# Patient Record
Sex: MALE | Race: BLACK OR AFRICAN AMERICAN | Employment: OTHER | URBAN - METROPOLITAN AREA
[De-identification: names, ages, dates, MRNs, and addresses within clinical notes are randomized per-mention and may not be internally consistent; named-entity substitution may affect disease eponyms.]

---

## 2024-02-09 ENCOUNTER — OFFICE VISIT (OUTPATIENT)
Age: 74
End: 2024-02-09
Payer: MEDICARE

## 2024-02-09 VITALS
RESPIRATION RATE: 17 BRPM | WEIGHT: 210 LBS | HEIGHT: 72 IN | SYSTOLIC BLOOD PRESSURE: 150 MMHG | HEART RATE: 75 BPM | OXYGEN SATURATION: 99 % | DIASTOLIC BLOOD PRESSURE: 82 MMHG | BODY MASS INDEX: 28.44 KG/M2 | TEMPERATURE: 97.2 F

## 2024-02-09 DIAGNOSIS — I11.0 HYPERTENSIVE HEART DISEASE WITH CHRONIC DIASTOLIC CONGESTIVE HEART FAILURE (HCC): ICD-10-CM

## 2024-02-09 DIAGNOSIS — I50.32 CHRONIC DIASTOLIC CHF (CONGESTIVE HEART FAILURE) (HCC): ICD-10-CM

## 2024-02-09 DIAGNOSIS — I35.0 NONRHEUMATIC AORTIC VALVE STENOSIS: Primary | ICD-10-CM

## 2024-02-09 DIAGNOSIS — I50.32 HYPERTENSIVE HEART DISEASE WITH CHRONIC DIASTOLIC CONGESTIVE HEART FAILURE (HCC): ICD-10-CM

## 2024-02-09 DIAGNOSIS — Z95.810 ICD (IMPLANTABLE CARDIOVERTER-DEFIBRILLATOR) IN PLACE: ICD-10-CM

## 2024-02-09 PROCEDURE — 99214 OFFICE O/P EST MOD 30 MIN: CPT

## 2024-02-09 PROCEDURE — 1123F ACP DISCUSS/DSCN MKR DOCD: CPT

## 2024-02-09 RX ORDER — ROSUVASTATIN CALCIUM 20 MG/1
20 TABLET, COATED ORAL
COMMUNITY
Start: 2024-01-11 | End: 2024-02-09

## 2024-02-09 RX ORDER — CLOPIDOGREL BISULFATE 75 MG/1
1 TABLET ORAL
COMMUNITY

## 2024-02-09 RX ORDER — ALBUTEROL SULFATE 90 UG/1
2 AEROSOL, METERED RESPIRATORY (INHALATION) 4 TIMES DAILY
COMMUNITY
Start: 2023-11-07

## 2024-02-09 RX ORDER — ACETAMINOPHEN 325 MG/1
975 TABLET ORAL EVERY 6 HOURS PRN
COMMUNITY
Start: 2024-01-11

## 2024-02-09 RX ORDER — BUMETANIDE 0.5 MG/1
1 TABLET ORAL DAILY
COMMUNITY
Start: 2024-01-11

## 2024-02-09 RX ORDER — FLUTICASONE PROPIONATE AND SALMETEROL 250; 50 UG/1; UG/1
1 POWDER RESPIRATORY (INHALATION) 2 TIMES DAILY
COMMUNITY
Start: 2024-01-18

## 2024-02-09 ASSESSMENT — ENCOUNTER SYMPTOMS
NAUSEA: 0
RHINORRHEA: 0
SORE THROAT: 0
SHORTNESS OF BREATH: 0
WHEEZING: 0
COUGH: 0
VOMITING: 0
DIARRHEA: 0
CONSTIPATION: 0
ABDOMINAL PAIN: 0

## 2024-02-09 NOTE — PATIENT INSTRUCTIONS
Recommendations:  -Please check blood pressure twice daily at home for 5 days. Write these down and place them in [facility] or call the office with them in a week  -Low sodium, exercise   -Bumex 1mg daily     Patient Education        DASH Diet: Care Instructions  Your Care Instructions     The DASH diet is an eating plan that can help lower your blood pressure. DASH stands for Dietary Approaches to Stop Hypertension. Hypertension is high blood pressure.  The DASH diet focuses on eating foods that are high in calcium, potassium, and magnesium. These nutrients can lower blood pressure. The foods that are highest in these nutrients are fruits, vegetables, low-fat dairy products, nuts, seeds, and legumes. But taking calcium, potassium, and magnesium supplements instead of eating foods that are high in those nutrients does not have the same effect. The DASH diet also includes whole grains, fish, and poultry.  The DASH diet is one of several lifestyle changes your doctor may recommend to lower your high blood pressure. Your doctor may also want you to decrease the amount of sodium in your diet. Lowering sodium while following the DASH diet can lower blood pressure even further than just the DASH diet alone.  Follow-up care is a key part of your treatment and safety. Be sure to make and go to all appointments, and call your doctor if you are having problems. It's also a good idea to know your test results and keep a list of the medicines you take.  How can you care for yourself at home?  Following the DASH diet  Eat 4 to 5 servings of fruit each day. A serving is 1 medium-sized piece of fruit, 1/2 cup raw or canned fruit, 1/4 cup dried fruit, or 4 ounces (1/2 cup) of fruit juice. Choose fruit more often than fruit juice.  Eat 4 to 5 servings of vegetables each day. A serving is 1 cup of lettuce or raw leafy vegetables, 1/2 cup of chopped or cooked vegetables, or 4 ounces (1/2 cup) of vegetable juice. Choose vegetables more

## 2024-02-09 NOTE — PROGRESS NOTES
Luis Felipe Morales (:  1950) is a 73 y.o. male, with history significant for CAD, PAD, hypertension, chronic A-fib on Xarelto, type 2 diabetes, severe symptomatic aortic stenosis, chronic diastolic CHF who presents for Follow-up ([facility] F/U from pacemaker placement)    He was hospitalized at [facility] with severe symptomatic aortic stenosis and acute decompensated diastolic heart failure.  He underwent TAVR successfully and this also improved his heart failure exacerbation.  He also underwent pacemaker implant for tacky/bradycardia syndrome on 2024.  This was a [employer] dual-chamber ICD.    Luis Felipe states that he has been doing well since being home from the hospital.  He denies chest pain, palpitations, shortness breath, abdominal pain, nausea, vomiting, fevers/chills, dizziness or lightheadedness, presyncope or syncope, lower extremity edema, PND.  He does have some orthopnea.  He reports he does not check his blood pressure at home but did take all of his medications today.    Relevant results  - Left heart cath 2023 without significant coronary artery disease    -Echo 2024 normal left ventricular systolic function with LVEF 60% with increased chamber size, mild concentric left ventricular hypertrophy, RV dimension mildly enlarged with visually normal systolic function, biatrial enlargement, moderate tricuspid regurgitation, elevated PASP, small circumferential pericardial effusion with no evidence of tamponade    Subjective   SUBJECTIVE/OBJECTIVE:  HPI  See above    Past Medical History:   Diagnosis Date    BPH (benign prostatic hyperplasia)     BPH with obstruction/lower urinary tract symptoms     Cardiac arrhythmia     COPD (chronic obstructive pulmonary disease) (HCC)     Coronary artery disease     Diabetes mellitus (HCC)     ED (erectile dysfunction)     Hypertension     Lower urinary tract symptoms (LUTS)     Nocturia     Peripheral artery